# Patient Record
Sex: MALE | URBAN - METROPOLITAN AREA
[De-identification: names, ages, dates, MRNs, and addresses within clinical notes are randomized per-mention and may not be internally consistent; named-entity substitution may affect disease eponyms.]

---

## 2019-11-01 ENCOUNTER — OFFICE VISIT (OUTPATIENT)
Dept: NEUROLOGY | Facility: CLINIC | Age: 84
End: 2019-11-01
Payer: MEDICARE

## 2019-11-01 VITALS
DIASTOLIC BLOOD PRESSURE: 70 MMHG | HEIGHT: 74.5 IN | HEART RATE: 60 BPM | BODY MASS INDEX: 29.21 KG/M2 | WEIGHT: 230 LBS | SYSTOLIC BLOOD PRESSURE: 140 MMHG | RESPIRATION RATE: 20 BRPM

## 2019-11-01 DIAGNOSIS — R26.9 GAIT DISTURBANCE: Primary | ICD-10-CM

## 2019-11-01 DIAGNOSIS — Z96.653 HISTORY OF BILATERAL KNEE REPLACEMENT: ICD-10-CM

## 2019-11-01 DIAGNOSIS — M48.062 LUMBAR STENOSIS WITH NEUROGENIC CLAUDICATION: ICD-10-CM

## 2019-11-01 PROCEDURE — 99204 OFFICE O/P NEW MOD 45 MIN: CPT | Performed by: PHYSICAL MEDICINE & REHABILITATION

## 2019-11-01 RX ORDER — HYDROCHLOROTHIAZIDE 25 MG/1
25 TABLET ORAL DAILY
Refills: 0 | COMMUNITY
Start: 2019-07-02

## 2019-11-01 RX ORDER — ROSUVASTATIN CALCIUM 10 MG/1
10 TABLET, COATED ORAL DAILY
Refills: 1 | COMMUNITY
Start: 2019-08-23

## 2019-11-01 RX ORDER — FINASTERIDE 5 MG/1
5 TABLET, FILM COATED ORAL DAILY
Refills: 1 | COMMUNITY
Start: 2019-08-11

## 2019-11-01 RX ORDER — TAMSULOSIN HYDROCHLORIDE 0.4 MG/1
0.4 CAPSULE ORAL DAILY
Refills: 2 | COMMUNITY
Start: 2019-10-24

## 2019-11-01 RX ORDER — TEMAZEPAM 15 MG/1
15 CAPSULE ORAL
Refills: 0 | COMMUNITY
Start: 2019-05-21

## 2019-11-01 RX ORDER — LOSARTAN POTASSIUM 100 MG/1
100 TABLET ORAL DAILY
Refills: 0 | COMMUNITY
Start: 2019-07-02

## 2019-11-01 RX ORDER — LEVOTHYROXINE SODIUM 0.1 MG/1
100 TABLET ORAL DAILY
Refills: 1 | COMMUNITY
Start: 2019-09-09

## 2019-11-01 NOTE — PROGRESS NOTES
[address]  Progress Note    CHIEF COMPLAINT:  Patient presents with:  Low Back Pain: Patient presents for low back pain.  States that he has had back pain long term and has gotten worse since knee surgery c/ Cap, Take 0.4 mg by mouth daily. , Disp: , Rfl: 2  temazepam 15 MG Oral Cap, Take 15 mg by mouth nightly., Disp: , Rfl: 0        ALLERGIES:   No Known Allergies    REVIEW OF SYSTEMS:   Patient-reported ROS  Constitutional  Sleep Disturbance: denies  Chills: Cognition: alert & oriented x 3, attentive, able to follow 2 step commands, comprehention intact, spontaneous speech intact  Strength: Lower extremities are generally weak, wide-based unsteady gait  Sensation: Normal lower extremities  Reflexes: Normal l

## 2019-11-03 PROBLEM — M48.062 LUMBAR STENOSIS WITH NEUROGENIC CLAUDICATION: Status: ACTIVE | Noted: 2019-11-03

## 2019-11-03 PROBLEM — Z96.653 HISTORY OF BILATERAL KNEE REPLACEMENT: Status: ACTIVE | Noted: 2019-11-03

## 2019-11-03 PROBLEM — R26.9 GAIT DISTURBANCE: Status: ACTIVE | Noted: 2019-11-03

## 2019-11-18 ENCOUNTER — PROCEDURE VISIT (OUTPATIENT)
Dept: NEUROLOGY | Facility: CLINIC | Age: 84
End: 2019-11-18
Payer: MEDICARE

## 2019-11-18 VITALS — WEIGHT: 235 LBS | BODY MASS INDEX: 30.16 KG/M2 | HEIGHT: 74 IN

## 2019-11-18 DIAGNOSIS — G60.9 IDIOPATHIC PERIPHERAL NEUROPATHY: Primary | ICD-10-CM

## 2019-11-18 PROCEDURE — 95908 NRV CNDJ TST 3-4 STUDIES: CPT | Performed by: PHYSICAL MEDICINE & REHABILITATION

## 2019-11-18 PROCEDURE — 95886 MUSC TEST DONE W/N TEST COMP: CPT | Performed by: PHYSICAL MEDICINE & REHABILITATION

## 2019-11-19 NOTE — PROGRESS NOTES
[address]  Electromyography Consultation      History of Present Illness: Today I had the opportunity to see John Osmar for electrodiagnostic consultation.   He is a 82year old male with a chief co Bladder Incontinence: denies   Musculoskeletal  Joint Stiffness: denies  Painful Joints: denies   Neurological  Loss of Strength Since last Visit: denies  Tingling/Numbness: denies          PHYSICAL EXAM:   Ht 74\"   Wt 235 lb (106.6 kg)   BMI 30.17 kg/m² R. VAST LATERALIS N None None None None N N N N   R. TIBIALIS ANTERIOR N None None None None N N N N   R. PERONEUS LONG N None None None None N N N N   R. MED GASTROCNEMIUS N None None None None N N N N   R. DORSAL INTEROSS (LL) Decr None None None None N

## (undated) NOTE — LETTER
[address]   Date:   11/1/2019     Name:   Salvador Nunez    YOB: 1935   MRN:   [id number]       WHERE IS YOUR PAIN NOW? Lucas the areas on your body where you feel the described sensations.   Use the appropriate

## (undated) NOTE — LETTER
[address]   Date:   11/18/2019     Name:   Ignacia Kelly    YOB: 1935   MRN:   [id number]       WHERE IS YOUR PAIN NOW? Lucas the areas on your body where you feel the described sensations.   Use the appropriate